# Patient Record
Sex: FEMALE | URBAN - METROPOLITAN AREA
[De-identification: names, ages, dates, MRNs, and addresses within clinical notes are randomized per-mention and may not be internally consistent; named-entity substitution may affect disease eponyms.]

---

## 2024-05-16 ENCOUNTER — APPOINTMENT (RX ONLY)
Dept: URBAN - METROPOLITAN AREA CLINIC 42 | Facility: CLINIC | Age: 54
Setting detail: DERMATOLOGY
End: 2024-05-16

## 2024-05-16 DIAGNOSIS — L259 CONTACT DERMATITIS AND OTHER ECZEMA, UNSPECIFIED CAUSE: ICD-10-CM

## 2024-05-16 PROBLEM — L23.9 ALLERGIC CONTACT DERMATITIS, UNSPECIFIED CAUSE: Status: ACTIVE | Noted: 2024-05-16

## 2024-05-16 PROCEDURE — ? DIAGNOSIS COMMENT

## 2024-05-16 PROCEDURE — ? COUNSELING

## 2024-05-16 PROCEDURE — 99203 OFFICE O/P NEW LOW 30 MIN: CPT

## 2024-05-16 NOTE — HPI: RASH
What Type Of Note Output Would You Prefer (Optional)?: Standard Output
How Severe Is Your Rash?: moderate
Is This A New Presentation, Or A Follow-Up?: Rash
Additional History: Pt started having  bumpy itchy rash with eye and face swelling and strep A positive 2 month ago. She has seen PCP  treated with strep A positive - treated amoxicillin.- recommend to see with and allergist. She has seen allergist-done all allergies testing with penicillin challenge that resulted negative. Allergies changed amoxicillin with Azithromycin and prednisone 4mg -21 tablets along with Zyrtec with improved

## 2024-05-16 NOTE — PROCEDURE: DIAGNOSIS COMMENT
Comment: Pt started having  bumpy itchy rash with eye and face swelling followed by strep A positive 2 month ago. She has seen PCP  tested strep A with positive result- treated amoxicillin.- recommend to see an allergist. She has seen allergist-done all allergies testing with penicillin challenge that resulted negative. Allergist changed amoxicillin with Azithromycin and prednisone 4mg -21 tablets along with Zyrtec with  improvement . \\n\\nToday there is no visible rash or swelling on pt’s face \\nAdvised to patch testing if sxs reappear\\nRecommended Cerave gentle wash and moisturizing \\nDiscussed RBSE

## 2024-05-16 NOTE — PROCEDURE: MIPS QUALITY
Quality 431: Preventive Care And Screening: Unhealthy Alcohol Use - Screening: Patient not identified as an unhealthy alcohol user when screened for unhealthy alcohol use using a systematic screening method
Quality 394a: Meningococcal Immunizations For Adolescents: Patient had one dose of meningococcal vaccine (serogroups A, C, W, Y) on or between the patient's 11th and 13th birthdays.
Quality 226: Preventive Care And Screening: Tobacco Use: Screening And Cessation Intervention: Patient screened for tobacco use and is an ex/non-smoker
Detail Level: Detailed
Quality 130: Documentation Of Current Medications In The Medical Record: Current Medications Documented